# Patient Record
Sex: MALE | Race: WHITE | NOT HISPANIC OR LATINO
[De-identification: names, ages, dates, MRNs, and addresses within clinical notes are randomized per-mention and may not be internally consistent; named-entity substitution may affect disease eponyms.]

---

## 2024-03-26 ENCOUNTER — NON-APPOINTMENT (OUTPATIENT)
Age: 59
End: 2024-03-26

## 2024-03-26 DIAGNOSIS — Z86.79 PERSONAL HISTORY OF OTHER DISEASES OF THE CIRCULATORY SYSTEM: ICD-10-CM

## 2024-03-26 RX ORDER — VENLAFAXINE HCL 50 MG
TABLET ORAL
Refills: 0 | Status: ACTIVE | COMMUNITY

## 2024-03-26 RX ORDER — TERBINAFINE HYDROCHLORIDE 250 MG/1
TABLET ORAL
Refills: 0 | Status: ACTIVE | COMMUNITY

## 2024-03-26 RX ORDER — ASPIRIN 325 MG/1
TABLET, FILM COATED ORAL
Refills: 0 | Status: ACTIVE | COMMUNITY

## 2024-04-05 ENCOUNTER — APPOINTMENT (OUTPATIENT)
Dept: PODIATRY | Facility: CLINIC | Age: 59
End: 2024-04-05

## 2024-05-03 ENCOUNTER — APPOINTMENT (OUTPATIENT)
Dept: PODIATRY | Facility: CLINIC | Age: 59
End: 2024-05-03
Payer: COMMERCIAL

## 2024-05-03 DIAGNOSIS — M79.674 PAIN IN RIGHT TOE(S): ICD-10-CM

## 2024-05-03 PROCEDURE — 11721 DEBRIDE NAIL 6 OR MORE: CPT

## 2024-05-03 PROCEDURE — 99203 OFFICE O/P NEW LOW 30 MIN: CPT | Mod: 25

## 2024-05-03 RX ORDER — TERBINAFINE HYDROCHLORIDE 250 MG/1
250 TABLET ORAL
Qty: 7 | Refills: 0 | Status: ACTIVE | COMMUNITY
Start: 2024-05-03 | End: 1900-01-01

## 2024-05-03 NOTE — PROCEDURE
[FreeTextEntry1] : Plan:  Right 1st toenail, right 4th toenail, right 5th toenail, left 1st toenail, left 2nd toenail.  Exam.  Slant back I & D was performed removing the offending nail spicule.  The area was dressed with Betadine, Neosporin and a dressing.  We discussed the risk of recurrence and future treatment alternatives.  ([id number]). Fungal nails were debrided using manual cutters and electrical  to patient tolerance. RX: Lamisil 250 mg QD x 7 days. ([id number]).  Patient to return: 1 Month

## 2024-05-03 NOTE — PHYSICAL EXAM
[FreeTextEntry3] : Vascular Exam: DP Pulse (left): 1/4. DP Pulse (right): 1/4. PT Pulse (left): 1/4. PT Pulse (right): 1/4. Capillary Return - L: Instantaneous. Capillary Return - R: Instantaneous. Temperature Grad - L: Hot to Warm. Temperature Grad - R: Hot to Warm.  year old male who  returns and complains of corns, callus,  toe nails that are difficult to cut, and long toenails. The patient is under the care of Chito Demarco. Corn/callus Pain: Difficulty walking, in shoe gear, on palpation, standing, walking and subject to breakdown. Mycotic Nail Pain: On palpation. Non-Dystrophic Nail Pain: None.  [de-identified] : Orthopedic Exam: No structural deformities present. [FreeTextEntry1] : Neurological Exam: Sharp / Dull - L: WNL. Sharp / Dull - R: WNL. Light Touch/pressure - L: WNL. Light Touch/pressure - R: WNL. Hot/cold - L: WNL. Hot/cold - R: WNL. Vibratory - L: WNL. Vibratory - R: WNL.

## 2024-05-03 NOTE — HISTORY OF PRESENT ILLNESS
[FreeTextEntry1] : Brady is a 58-year-old male who presents this morning for continued care of a fungus infection in his toenails.  He feels like he is getting an ingrown on his 2nd toe.  He has taken 7 pulses of Lamisil with improvement.

## 2024-06-07 ENCOUNTER — APPOINTMENT (OUTPATIENT)
Dept: PODIATRY | Facility: CLINIC | Age: 59
End: 2024-06-07

## 2024-06-07 DIAGNOSIS — M79.675 PAIN IN LEFT TOE(S): ICD-10-CM

## 2024-06-07 DIAGNOSIS — B35.1 TINEA UNGUIUM: ICD-10-CM

## 2024-06-07 DIAGNOSIS — L60.0 INGROWING NAIL: ICD-10-CM

## 2024-06-07 PROCEDURE — 11765 WEDGE EXCISION SKN NAIL FOLD: CPT | Mod: TA

## 2024-06-07 PROCEDURE — 99213 OFFICE O/P EST LOW 20 MIN: CPT | Mod: 25

## 2024-06-07 RX ORDER — TERBINAFINE HYDROCHLORIDE 250 MG/1
250 TABLET ORAL
Qty: 7 | Refills: 0 | Status: ACTIVE | COMMUNITY
Start: 2024-06-07 | End: 1900-01-01

## 2024-06-07 NOTE — REVIEW OF SYSTEMS
Patient understands condition, prognosis and need for follow up care. [Negative] : Gastrointestinal [de-identified] : fungal toenails, ingrown toenails

## 2024-06-07 NOTE — HISTORY OF PRESENT ILLNESS
[FreeTextEntry1] : Brady is a 58-year-old male who presents for continued care of a fungus infection in his toenails.  He has taken 8 pulses of Lamisil with improvement.  His left big toe has gotten red and swollen over the last week.

## 2024-06-07 NOTE — PROCEDURE
[FreeTextEntry1] : Plan:  Right 1st toenail, right 4th toenail, right 5th toenail, left 1st toenail, left 2nd toenail.  Examination.  ([id number]).  Slant back I & D was performed removing the offending nail spicule and thick hard skin from the nail medial nail groove.  Serous drainage was expressed.  The area was dressed with Betadine, Neosporin and a dressing.  He is to keep it covered daily until resolved.  We discussed the risk of recurrence and future treatment alternatives.  ([id number]). Fungal nails were debrided using manual cutters and electrical  to patient tolerance. RX: Lamisil 250 mg QD x 7 days.   Patient to return: 1 Month

## 2024-06-07 NOTE — PHYSICAL EXAM
[FreeTextEntry3] : Vascular Exam: DP Pulse (left): 1/4. DP Pulse (right): 1/4. PT Pulse (left): 1/4. PT Pulse (right): 1/4. Capillary Return - L: Instantaneous. Capillary Return - R: Instantaneous. Temperature Grad - L: Hot to Warm. Temperature Grad - R: Hot to Warm.  year old male who  returns and complains of corns, callus,  toe nails that are difficult to cut, and long toenails. The patient is under the care of Chito Demarco. Corn/callus Pain: Difficulty walking, in shoe gear, on palpation, standing, walking and subject to breakdown. Mycotic Nail Pain: On palpation. Non-Dystrophic Nail Pain: None.  [de-identified] : Orthopedic Exam: No structural deformities present. [FreeTextEntry1] : Neurological Exam: Sharp / Dull - L: WNL. Sharp / Dull - R: WNL. Light Touch/pressure - L: WNL. Light Touch/pressure - R: WNL. Hot/cold - L: WNL. Hot/cold - R: WNL. Vibratory - L: WNL. Vibratory - R: WNL.

## 2024-07-10 ENCOUNTER — APPOINTMENT (OUTPATIENT)
Dept: PODIATRY | Facility: CLINIC | Age: 59
End: 2024-07-10
Payer: COMMERCIAL

## 2024-07-10 DIAGNOSIS — B35.1 TINEA UNGUIUM: ICD-10-CM

## 2024-07-10 PROCEDURE — 99213 OFFICE O/P EST LOW 20 MIN: CPT

## 2024-07-10 RX ORDER — TERBINAFINE HYDROCHLORIDE 250 MG/1
250 TABLET ORAL
Qty: 7 | Refills: 0 | Status: ACTIVE | COMMUNITY
Start: 2024-07-10 | End: 1900-01-01

## 2024-08-15 ENCOUNTER — APPOINTMENT (OUTPATIENT)
Dept: PODIATRY | Facility: CLINIC | Age: 59
End: 2024-08-15